# Patient Record
Sex: FEMALE | Race: WHITE | NOT HISPANIC OR LATINO | Employment: UNEMPLOYED | URBAN - NONMETROPOLITAN AREA
[De-identification: names, ages, dates, MRNs, and addresses within clinical notes are randomized per-mention and may not be internally consistent; named-entity substitution may affect disease eponyms.]

---

## 2019-10-23 ENCOUNTER — HOSPITAL ENCOUNTER (EMERGENCY)
Facility: HOSPITAL | Age: 54
Discharge: HOME | End: 2019-10-24
Attending: EMERGENCY MEDICINE | Admitting: FAMILY MEDICINE

## 2019-10-23 ENCOUNTER — APPOINTMENT (OUTPATIENT)
Dept: GENERAL RADIOLOGY | Facility: HOSPITAL | Age: 54
End: 2019-10-23

## 2019-10-23 DIAGNOSIS — M25.461 EFFUSION OF RIGHT KNEE: Primary | ICD-10-CM

## 2019-10-23 PROCEDURE — 99283 EMERGENCY DEPT VISIT LOW MDM: CPT

## 2019-10-23 PROCEDURE — 25010000002 KETOROLAC TROMETHAMINE PER 15 MG: Performed by: PHYSICIAN ASSISTANT

## 2019-10-23 PROCEDURE — 25010000002 MORPHINE PER 10 MG: Performed by: EMERGENCY MEDICINE

## 2019-10-23 PROCEDURE — 73562 X-RAY EXAM OF KNEE 3: CPT | Performed by: RADIOLOGY

## 2019-10-23 PROCEDURE — 96372 THER/PROPH/DIAG INJ SC/IM: CPT

## 2019-10-23 PROCEDURE — 73562 X-RAY EXAM OF KNEE 3: CPT

## 2019-10-23 RX ORDER — KETOROLAC TROMETHAMINE 30 MG/ML
30 INJECTION, SOLUTION INTRAMUSCULAR; INTRAVENOUS ONCE
Status: COMPLETED | OUTPATIENT
Start: 2019-10-23 | End: 2019-10-23

## 2019-10-23 RX ORDER — ONDANSETRON 4 MG/1
4 TABLET, ORALLY DISINTEGRATING ORAL ONCE
Status: COMPLETED | OUTPATIENT
Start: 2019-10-23 | End: 2019-10-23

## 2019-10-23 RX ADMIN — ONDANSETRON 4 MG: 4 TABLET, ORALLY DISINTEGRATING ORAL at 23:24

## 2019-10-23 RX ADMIN — KETOROLAC TROMETHAMINE 30 MG: 30 INJECTION, SOLUTION INTRAMUSCULAR; INTRAVENOUS at 23:18

## 2019-10-23 RX ADMIN — MORPHINE SULFATE 4 MG: 4 INJECTION, SOLUTION INTRAMUSCULAR; INTRAVENOUS at 23:18

## 2019-10-24 ENCOUNTER — APPOINTMENT (OUTPATIENT)
Dept: CT IMAGING | Facility: HOSPITAL | Age: 54
End: 2019-10-24

## 2019-10-24 VITALS
BODY MASS INDEX: 49.51 KG/M2 | HEART RATE: 72 BPM | SYSTOLIC BLOOD PRESSURE: 155 MMHG | WEIGHT: 290 LBS | HEIGHT: 64 IN | RESPIRATION RATE: 19 BRPM | OXYGEN SATURATION: 99 % | TEMPERATURE: 98.2 F | DIASTOLIC BLOOD PRESSURE: 85 MMHG

## 2019-10-24 PROCEDURE — 73700 CT LOWER EXTREMITY W/O DYE: CPT

## 2019-10-24 NOTE — ED NOTES
Provided patient with pillows to support knee at this time.     Collette, Ashley N, RN  10/23/19 5742

## 2020-03-03 ENCOUNTER — TRANSCRIBE ORDERS (OUTPATIENT)
Dept: GENERAL RADIOLOGY | Facility: HOSPITAL | Age: 55
End: 2020-03-03

## 2020-03-03 ENCOUNTER — HOSPITAL ENCOUNTER (OUTPATIENT)
Dept: GENERAL RADIOLOGY | Facility: HOSPITAL | Age: 55
Discharge: HOME | End: 2020-03-03
Admitting: INTERNAL MEDICINE

## 2020-03-03 DIAGNOSIS — M25.562 PAIN IN BOTH KNEES, UNSPECIFIED CHRONICITY: ICD-10-CM

## 2020-03-03 DIAGNOSIS — M13.0 POLYARTHRITIS: ICD-10-CM

## 2020-03-03 DIAGNOSIS — M54.50 LOW BACK PAIN, UNSPECIFIED BACK PAIN LATERALITY, UNSPECIFIED CHRONICITY, UNSPECIFIED WHETHER SCIATICA PRESENT: ICD-10-CM

## 2020-03-03 DIAGNOSIS — M15.0 PRIMARY GENERALIZED (OSTEO)ARTHRITIS: ICD-10-CM

## 2020-03-03 DIAGNOSIS — M25.561 PAIN IN BOTH KNEES, UNSPECIFIED CHRONICITY: ICD-10-CM

## 2020-03-03 DIAGNOSIS — M79.641 PAIN IN BOTH HANDS: ICD-10-CM

## 2020-03-03 DIAGNOSIS — M79.642 PAIN IN BOTH HANDS: ICD-10-CM

## 2020-03-03 DIAGNOSIS — M79.672 PAIN IN BOTH FEET: ICD-10-CM

## 2020-03-03 DIAGNOSIS — M79.671 PAIN IN BOTH FEET: ICD-10-CM

## 2020-03-03 DIAGNOSIS — D89.89 OTHER SPECIFIED DISORDERS INVOLVING THE IMMUNE MECHANISM, NOT ELSEWHERE CLASSIFIED (HCC): ICD-10-CM

## 2020-03-03 PROCEDURE — 73120 X-RAY EXAM OF HAND: CPT

## 2020-03-03 PROCEDURE — 73562 X-RAY EXAM OF KNEE 3: CPT

## 2020-03-03 PROCEDURE — 73630 X-RAY EXAM OF FOOT: CPT

## 2022-01-14 NOTE — ED PROVIDER NOTES
Pt scheduled for this afternoon Subjective     History provided by:  Patient   used: No    Fall   Mechanism of injury: fall    Injury location:  Leg  Leg injury location:  R knee  Incident location:  Outdoors  Time since incident:  2 hours  Arrived directly from scene: yes    Fall:     Fall occurred: knocked down by a calf and twisted knee.    Impact surface:  Dirt    Entrapped after fall: no    Suspicion of alcohol use: no    Suspicion of drug use: no    Prior to arrival data:     Patient ambulatory at scene: yes      Blood loss:  None    Orientation at scene:  Person, place, time and situation    Loss of consciousness: no      Amnesic to event: no      Immobilization:  None  Associated symptoms: no abdominal pain, no back pain, no chest pain and no neck pain        Review of Systems   Constitutional: Negative.  Negative for fever.   HENT: Negative.    Respiratory: Negative.    Cardiovascular: Negative.  Negative for chest pain.   Gastrointestinal: Negative.  Negative for abdominal pain.   Endocrine: Negative.    Genitourinary: Negative.  Negative for dysuria.   Musculoskeletal: Negative for back pain and neck pain.        (+) right knee pain   Skin: Negative.    Neurological: Negative.    Psychiatric/Behavioral: Negative.    All other systems reviewed and are negative.      No past medical history on file.    Allergies   Allergen Reactions   • Sulfa Antibiotics Anaphylaxis       No past surgical history on file.    No family history on file.    Social History     Socioeconomic History   • Marital status:      Spouse name: Not on file   • Number of children: Not on file   • Years of education: Not on file   • Highest education level: Not on file           Objective   Physical Exam   Constitutional: She is oriented to person, place, and time. She appears well-developed and well-nourished. No distress.   Morbidly obese   HENT:   Head: Normocephalic and atraumatic.   Right Ear: External ear normal.   Left Ear: External ear  normal.   Nose: Nose normal.   Eyes: Conjunctivae and EOM are normal. Pupils are equal, round, and reactive to light.   Neck: Normal range of motion. Neck supple. No JVD present. No tracheal deviation present.   Cardiovascular: Normal rate, regular rhythm and normal heart sounds.   No murmur heard.  Pulmonary/Chest: Effort normal and breath sounds normal. No respiratory distress. She has no wheezes.   Abdominal: Soft. Bowel sounds are normal. There is no tenderness.   Musculoskeletal: She exhibits no edema or deformity.        Right knee: She exhibits decreased range of motion. She exhibits no swelling and no deformity. Tenderness found. Medial joint line and lateral joint line tenderness noted.        Cervical back: Normal.        Thoracic back: Normal.        Lumbar back: Normal.   Neurological: She is alert and oriented to person, place, and time. No cranial nerve deficit.   Skin: Skin is warm and dry. No rash noted. She is not diaphoretic. No erythema. No pallor.   Psychiatric: She has a normal mood and affect. Her behavior is normal. Thought content normal.   Nursing note and vitals reviewed.      Procedures           ED Course  ED Course as of Oct 24 0138   Wed Oct 23, 2019   2250 IMPRESSION:   Soft tissue edema and joint effusion. Osteoarthritis but no evidence of fracture or dislocation.    This report was finalized on 10/23/2019 9:51 PM by Dr. Jay Rosas MD.  XR Knee 3 View Right [TK]   u Oct 24, 2019   0121 IMPRESSION:   No evidence of fracture. There is a moderate joint effusion at the knee and there is osteoarthritic change in the medial compartment.    Signer Name: Jarrett Chau MD  Signed: 10/24/2019 1:18 AM  Workstation Name: [id number]   [facility]  CT Lower Extremity Right Without Contrast [TK]      ED Course User Index  [TK] Simeon Tello, NICANOR                  MDM  Number of Diagnoses or Management Options  Effusion of right knee: new and requires workup      Amount and/or Complexity of Data Reviewed  Tests in the radiology section of CPT®: reviewed and ordered    Risk of Complications, Morbidity, and/or Mortality  Presenting problems: moderate  Diagnostic procedures: moderate  Management options: moderate    Patient Progress  Patient progress: stable      Final diagnoses:   Effusion of right knee              Simeon Tello PA-C  10/24/19 0138

## 2023-03-16 ENCOUNTER — TRANSCRIBE ORDERS (OUTPATIENT)
Dept: ADMINISTRATIVE | Facility: HOSPITAL | Age: 58
End: 2023-03-16
Payer: MEDICARE

## 2023-03-16 ENCOUNTER — HOSPITAL ENCOUNTER (OUTPATIENT)
Dept: GENERAL RADIOLOGY | Facility: HOSPITAL | Age: 58
Discharge: HOME | End: 2023-03-16
Admitting: NURSE PRACTITIONER
Payer: MEDICARE

## 2023-03-16 DIAGNOSIS — M25.512 LEFT SHOULDER PAIN, UNSPECIFIED CHRONICITY: Primary | ICD-10-CM

## 2023-03-16 DIAGNOSIS — M79.602 LEFT ARM PAIN: ICD-10-CM

## 2023-03-16 DIAGNOSIS — M25.512 LEFT SHOULDER PAIN, UNSPECIFIED CHRONICITY: ICD-10-CM

## 2023-03-16 PROCEDURE — 73060 X-RAY EXAM OF HUMERUS: CPT

## 2023-03-16 PROCEDURE — 73030 X-RAY EXAM OF SHOULDER: CPT

## 2023-03-16 PROCEDURE — 73030 X-RAY EXAM OF SHOULDER: CPT | Performed by: RADIOLOGY

## 2023-03-16 PROCEDURE — 73060 X-RAY EXAM OF HUMERUS: CPT | Performed by: RADIOLOGY

## 2023-04-03 ENCOUNTER — TRANSCRIBE ORDERS (OUTPATIENT)
Dept: ADMINISTRATIVE | Facility: HOSPITAL | Age: 58
End: 2023-04-03
Payer: MEDICARE

## 2023-04-03 DIAGNOSIS — M79.602 LEFT UPPER LIMB PAIN: ICD-10-CM

## 2023-04-03 DIAGNOSIS — M25.512 LEFT SHOULDER PAIN, UNSPECIFIED CHRONICITY: Primary | ICD-10-CM

## 2023-05-02 ENCOUNTER — HOSPITAL ENCOUNTER (OUTPATIENT)
Dept: MRI IMAGING | Facility: HOSPITAL | Age: 58
Discharge: HOME | End: 2023-05-02
Admitting: NURSE PRACTITIONER
Payer: MEDICARE

## 2023-05-02 DIAGNOSIS — M25.512 LEFT SHOULDER PAIN, UNSPECIFIED CHRONICITY: ICD-10-CM

## 2023-05-02 DIAGNOSIS — M79.602 LEFT UPPER LIMB PAIN: ICD-10-CM

## 2023-05-02 PROCEDURE — 73221 MRI JOINT UPR EXTREM W/O DYE: CPT

## 2023-05-02 PROCEDURE — 73221 MRI JOINT UPR EXTREM W/O DYE: CPT | Performed by: RADIOLOGY

## 2023-06-12 ENCOUNTER — OFFICE VISIT (OUTPATIENT)
Dept: ORTHOPEDIC SURGERY | Facility: CLINIC | Age: 58
End: 2023-06-12
Payer: MEDICARE

## 2023-06-12 VITALS
BODY MASS INDEX: 49.68 KG/M2 | HEIGHT: 64 IN | WEIGHT: 291.01 LBS | DIASTOLIC BLOOD PRESSURE: 74 MMHG | HEART RATE: 69 BPM | SYSTOLIC BLOOD PRESSURE: 119 MMHG

## 2023-06-12 DIAGNOSIS — M75.112 NONTRAUMATIC INCOMPLETE TEAR OF LEFT ROTATOR CUFF: Primary | ICD-10-CM

## 2023-06-12 DIAGNOSIS — M75.42 IMPINGEMENT SYNDROME OF LEFT SHOULDER: ICD-10-CM

## 2023-06-12 PROCEDURE — 99204 OFFICE O/P NEW MOD 45 MIN: CPT | Performed by: ORTHOPAEDIC SURGERY

## 2023-06-12 RX ORDER — LEVOTHYROXINE SODIUM 137 UG/1
TABLET ORAL
COMMUNITY
Start: 2023-03-02

## 2023-06-12 RX ORDER — OMEPRAZOLE 40 MG/1
CAPSULE, DELAYED RELEASE ORAL
COMMUNITY
Start: 2023-02-16

## 2023-06-12 RX ORDER — DILTIAZEM HYDROCHLORIDE 240 MG/1
CAPSULE, COATED, EXTENDED RELEASE ORAL
COMMUNITY
Start: 2023-03-16

## 2023-06-12 RX ORDER — CYANOCOBALAMIN 1000 UG/ML
INJECTION, SOLUTION INTRAMUSCULAR; SUBCUTANEOUS
COMMUNITY
Start: 2023-03-16

## 2023-06-12 RX ORDER — HYDROCHLOROTHIAZIDE 25 MG/1
TABLET ORAL
COMMUNITY
Start: 2023-03-16

## 2023-06-12 RX ORDER — CARIPRAZINE 1.5 MG/1
CAPSULE, GELATIN COATED ORAL
COMMUNITY
Start: 2023-06-05

## 2023-06-12 RX ORDER — ERGOCALCIFEROL 1.25 MG/1
CAPSULE ORAL
COMMUNITY
Start: 2023-03-16

## 2023-06-12 RX ORDER — FLUOXETINE HYDROCHLORIDE 40 MG/1
CAPSULE ORAL
COMMUNITY
Start: 2023-03-16

## 2023-06-12 NOTE — PROGRESS NOTES
New Patient Visit      Patient: April Argueta  YOB: 1965  Date of Encounter: 06/12/2023        Chief Complaint:   Chief Complaint   Patient presents with   • Left Shoulder - Pain           HPI:   April Argueta, 58 y.o. female, referred by Jojo Jung APRN presents evaluation of left shoulder pain which began approximately 6 weeks ago she reports no trauma.  She describes pain with overhead lifting and reaching behind her.  Recalls no trauma recent or remote her current symptoms are moderate.  She has completed MRI.  She has otherwise had no treatment.  Past medical history is remarkable for gastric bypass and breast cancer.  Family history is positive for diabetes.  Denies weakness or numbness to her left arm.          Active Problem List:  Patient Active Problem List   Diagnosis   • Nontraumatic incomplete tear of left rotator cuff           Past Medical History:  Past Medical History:   Diagnosis Date   • Acid reflux    • Breast cancer    • High cholesterol    • Hypertension            Past Surgical History:  Past Surgical History:   Procedure Laterality Date   • CHOLECYSTECTOMY     • GASTRIC BYPASS             Family History:  Family History   Problem Relation Age of Onset   • Diabetes Mother    • Hypertension Mother    • Hypertension Sister    • Diabetes Sister    • Cancer Sister          Social History:  Social History     Socioeconomic History   • Marital status:    Tobacco Use   • Smoking status: Never   • Smokeless tobacco: Never   Vaping Use   • Vaping Use: Never used   Substance and Sexual Activity   • Alcohol use: Never   • Drug use: Never   • Sexual activity: Defer     Body mass index is 49.93 kg/m².      Medications:  Current Outpatient Medications   Medication Sig Dispense Refill   • cyanocobalamin 1000 MCG/ML injection      • dilTIAZem CD (CARDIZEM CD) 240 MG 24 hr capsule      • FLUoxetine (PROzac) 40 MG capsule      • hydroCHLOROthiazide (HYDRODIURIL) 25 MG tablet      •  "levothyroxine (SYNTHROID, LEVOTHROID) 137 MCG tablet      • omeprazole (priLOSEC) 40 MG capsule      • vitamin D (ERGOCALCIFEROL) 1.25 MG (33098 UT) capsule capsule      • Vraylar 1.5 MG capsule capsule        No current facility-administered medications for this visit.         Allergies:  Allergies   Allergen Reactions   • Sulfa Antibiotics Anaphylaxis         Review of Systems:   Review of Systems      Physical Exam:   Physical Exam  GENERAL: 58 y.o. female, alert and oriented X 3 in no acute distress.   Visit Vitals  /74   Pulse 69   Ht 162.6 cm (64.02\")   Wt 132 kg (291 lb 0.1 oz)   BMI 49.93 kg/m²       GENERAL APPEARANCE: Awake, alert & oriented, in no acute distress and well developed, well nourished.   PSYCH: Normal mood and affect  LUNGS: Breathing nonlabored, no wheezing  EYES: Sclera anicteric, pupils equal  CARDIOVASCULAR: Palpable pulses. Capillary refill less than 2 seconds  INTEGUMENTARY: Skin intact, co clubbing, cyanosis  NEUROLOGIC: Normal Sensation  MUSCULOSKELETAL:  Orthopedic Examination: The upper extremity reveals elevation to 170 degrees moderate signs of impingement with Jobes maneuver she has mild to moderate discomfort demonstrates good strength.  She has full internal and external rotation mild localized tenderness to the AC joint and moderate pain with crossarm adduction bicipital groove is nontender pushoff test is negative neurovascular exam is grossly intact.          Radiology/Labs:     Radiographs shoulder reviewed demonstrates normal glenohumeral joint normal subacromial space moderate arthritic changes involving the acromioclavicular joint.    MRI left shoulder by report describes this tear distal supraspinatus tendon anterior fibers is approximately 7 x 6 mm.  Mild arthritis of the acromioclavicular joint and moderate subcu acromial bursitis.  My review confirms the above with severe impingement.        Assessment & Plan:   58 y.o. female presents atraumatic left shoulder " pain which began 6 weeks ago MRI identifying partial-thickness rotator cuff tear with moderate impingement.  We discussed options she is referred to physical therapy but may eventually require subacromial decompression.  We will see her back in 8 weeks.        ICD-10-CM ICD-9-CM   1. Nontraumatic incomplete tear of left rotator cuff  M75.112 726.13   2. Impingement syndrome of left shoulder  M75.42 726.2             Cc:   Jojo Jung APRN                This document has been electronically signed by Emeterio Colin MD   June 13, 2023 10:05 EDT

## 2023-06-13 PROBLEM — M75.112 NONTRAUMATIC INCOMPLETE TEAR OF LEFT ROTATOR CUFF: Status: ACTIVE | Noted: 2023-06-13

## 2023-06-15 ENCOUNTER — TREATMENT (OUTPATIENT)
Dept: PHYSICAL THERAPY | Facility: CLINIC | Age: 58
End: 2023-06-15
Payer: MEDICARE

## 2023-06-15 DIAGNOSIS — M75.42 IMPINGEMENT SYNDROME OF LEFT SHOULDER: ICD-10-CM

## 2023-06-15 DIAGNOSIS — M75.112 NONTRAUMATIC INCOMPLETE TEAR OF LEFT ROTATOR CUFF: Primary | ICD-10-CM

## 2023-06-15 NOTE — PROGRESS NOTES
"    Physical Therapy Initial Evaluation and Plan of Care    Patient: April Argueta   : 1965  Diagnosis/ICD-10 Code:  Nontraumatic incomplete tear of left rotator cuff [M75.112]  Referring practitioner: Emeterio Colin, *  Date of Initial Visit: 6/15/2023  Today's Date: 6/15/2023  Patient seen for 1 session         Visit Diagnoses:    ICD-10-CM ICD-9-CM   1. Nontraumatic incomplete tear of left rotator cuff  M75.112 726.13   2. Impingement syndrome of left shoulder  M75.42 726.2         Subjective Questionnaire: QuickDASH: 36.36% impaired      Subjective Evaluation    History of Present Illness  Onset date: 1.5 months.  Mechanism of injury: Pt reports approximately 1.5 months ago she began having soreness of the left shoulder. The patient states she does not recall a specific incident that resulted in increased pain. She states she would use her right arm to help assist the left with movement. The patient reports improvement in left shoulder pain, however continues to have difficulty with self-care and daily tasks. She states an MRI was performed of the left shoulder, revealing a tear of the rotator cuff. The patient reports she has seen Dr. Colin and was referred to physical therapy. She states he instructed her to return in 8 weeks to consider surgery if physical therapy does not help with pain. The patient states the bones \"are together and rubbing on the tendon, so I don't know if I can tolerate therapy but I will try\". The patient states she is hoping to avoid surgery.      Patient Occupation: Unemployed Quality of life: good    Pain  Current pain ratin  At best pain ratin  At worst pain ratin  Location: Left shoulder  Quality: dull ache and burning  Relieving factors: rest  Aggravating factors: lifting, movement, overhead activity, prolonged positioning, outstretched reach, repetitive movement and sleeping  Progression: improved    Social Support  Lives with: spouse    Hand " "dominance: right    Diagnostic Tests  Abnormal MRI: \"7 mm x 6 mm full-thickness tear distal supraspinatus tendon anterior fibers at level of humeral insertion. No retraction.\" See chart review.    Patient Goals  Patient goals for therapy: decreased edema, decreased pain, increased motion, increased strength, independence with ADLs/IADLs and return to sport/leisure activities           Objective          Cervical/Thoracic Screen   Cervical range of motion within normal limits    Neurological Testing     Sensation     Shoulder   Left Shoulder   Intact: light touch    Right Shoulder   Intact: Light touch    Active Range of Motion   Left Shoulder   Flexion: 162 degrees   Abduction: 124 degrees   External rotation 90°: 72 degrees   Internal rotation 90°: 61 degrees     Strength/Myotome Testing     Left Shoulder     Planes of Motion   Flexion: 4   Abduction: 4   External rotation at 0°: 4+   Internal rotation at 0°: 4+     Isolated Muscles   Biceps: 4   Triceps: 4     Right Shoulder     Planes of Motion   Flexion: 4+   Abduction: 4+   External rotation at 0°: 4+   Internal rotation at 0°: 4+     Isolated Muscles   Biceps: 4+   Triceps: 4+     Left Wrist/Hand      (2nd hand position)     Trial 1: 55 lbs    Trial 2: 54 lbs    Trial 3: 45 lbs    Average: 51.33 lbs    Right Wrist/Hand      (2nd hand position)     Trial 1: 60 lbs    Trial 2: 62 lbs    Trial 3: 60 lbs    Average: 60.67 lbs    Tests   Cervical     Left   Negative active compression ([place]).     Left Shoulder   Positive Hawkin's.   Negative drop arm.         Assessment & Plan     Assessment  Impairments: abnormal or restricted ROM, activity intolerance, impaired physical strength, lacks appropriate home exercise program, pain with function and safety issue  Functional Limitations: carrying objects, lifting, sleeping, pulling, pushing, uncomfortable because of pain, reaching behind back, reaching overhead and unable to perform repetitive " "tasks  Assessment details: The patient is a 58 year old female presenting to the clinic with left shoulder pain. She demonstrated impaired left shoulder ROM and strength. MRI findings revealed a left rotator cuff tear. The patient reported a 36.36% impairment on the Quick Dash. The patient would benefit from skilled physical therapy to address functional limitations and impairments. She will be progressed as indicated to achieve max function.  Today's session included therapeutic exercises for improved left shoulder ROM and scapular stability. Tactile and verbal cues were provided for proper form. The patient was instructed to perform therapeutic exercises in the \"pain-free range\". Today's session concluded with cryotherapy combined with IFC to the left UT/shoulder to address pain and inflammation. No skin irritation was observed following modalities. The patient reported 1/10 left shoulder pain following today's session. A written HEP was provided to the patient, with patient demonstrating understanding and proper form.    Prognosis: good    Goals  Plan Goals: SHORT TERM GOALS:     2 weeks  1. Pt will be instructed in HEP for improved independence.  2. Pt to demonstrate AROM of the left shoulder to 17 degrees flexion and 140 abduction to improve ability to perform ADL's.  3. Pt to demonstrate ability to perform 30 minutes continuous activity in the clinic without increase in pain for improved independence.    LONG TERM GOALS:   6 weeks  1. Pt to demonstrate AROM of the left shoulder to WNL to allow ability to perform all necessary functional activities  2. Pt to demonstrate 4+/5 LUE strength for improved function.  3. Pt to report no more than 20% impairment on the Quick Dash for improved functional independence.      Plan  Therapy options: will be seen for skilled therapy services  Planned modality interventions: iontophoresis, cryotherapy, electrical stimulation/Russian stimulation, thermotherapy (hydrocollator " packs), ultrasound, hydrotherapy and dry needling  Planned therapy interventions: manual therapy, motor coordination training, neuromuscular re-education, postural training, soft tissue mobilization, spinal/joint mobilization, stretching, strengthening, therapeutic activities, joint mobilization, home exercise program, functional ROM exercises, flexibility, fine motor coordination training and body mechanics training  Frequency: 2x week  Duration in weeks: 12  Treatment plan discussed with: patient  Plan details: Pt will be re-assessed upon follow up for tolerance to pain relieving exercise program.     Moderate Evaluation  32791  Re-evaluation   93028    Therapeutic exercise  83812  Therapeutic activity    52250  Neuromuscular re-education   51605  Manual therapy   94598    Attended e-stim  66412  Unattended e-stim (Medicaid/Medicare)     Moist heat/cryotherapy 02082   Ultrasound   34019  Iontophoresis   18159                  Timed:         Manual Therapy:         mins  59423;     Therapeutic Exercise:    12     mins  75158;     Neuromuscular Jarvis:        mins  82759;    Therapeutic Activity:          mins  91208;     Gait Training:           mins  01043;     Ultrasound:          mins  48719;    Ionto                                   mins   69177  Self Care                            mins   61443      Un-Timed:  Electrical Stimulation:    10     mins  49472 ( );  Dry Needling          mins self-pay  Traction          mins 16936  Low Eval          Mins  27148  Mod Eval     30     Mins  75359  High Eval                            Mins  27487  Canalith Repos         mins 34947      Timed Treatment:   12   mins   Total Treatment:     52   mins          PT: Ashley Claudene Dalton, PT     License Number: [id number]  Electronically signed by Ashley Claudene Dalton, PT, 06/15/23, 10:13 AM EDT    Certification Period: 6/15/2023 thru 9/12/2023  I certify that the therapy services are furnished while this patient is  under my care.  The services outlined above are required by this patient, and will be reviewed every 90 days.         Physician Signature:__________________________________________________    PHYSICIAN: Emeterio Colin MD  NPI: [id number]                                      DATE:      Please sign and return via fax to [email or web address] . Thank you, [facility].

## 2023-06-19 ENCOUNTER — TREATMENT (OUTPATIENT)
Dept: PHYSICAL THERAPY | Facility: CLINIC | Age: 58
End: 2023-06-19
Payer: MEDICARE

## 2023-06-19 DIAGNOSIS — M75.42 IMPINGEMENT SYNDROME OF LEFT SHOULDER: ICD-10-CM

## 2023-06-19 DIAGNOSIS — M75.112 NONTRAUMATIC INCOMPLETE TEAR OF LEFT ROTATOR CUFF: Primary | ICD-10-CM

## 2023-06-19 PROCEDURE — 97110 THERAPEUTIC EXERCISES: CPT | Performed by: PHYSICAL THERAPIST

## 2023-06-19 PROCEDURE — G0283 ELEC STIM OTHER THAN WOUND: HCPCS | Performed by: PHYSICAL THERAPIST

## 2023-06-19 NOTE — PROGRESS NOTES
Physical Therapy Daily Treatment Note      Patient: April Argueta   : 1965  Referring practitioner: Emeterio Colin, *  Date of Initial Visit: Type: THERAPY  Noted: 6/15/2023  Today's Date: 2023  Patient seen for 2 sessions       Visit Diagnoses:    ICD-10-CM ICD-9-CM   1. Nontraumatic incomplete tear of left rotator cuff  M75.112 726.13   2. Impingement syndrome of left shoulder  M75.42 726.2       Subjective: Patient reports 1-2/10 left shoulder pain upon arrival to therapy. Pt verbalizes compliance of initiating home program.      Objective   See Exercise, Manual, and Modality Logs for complete treatment.       Assessment/Plan: Patient completed today's session with reports of 2/10 left shoulder pain following. Treatment consisted of therex as listed to assist with improved left shoulder ROM and improved scapular stability f/b modalities at conclusion. Pt was provided with cues and demonstration during exercise to maintain form, and for improved scapular awareness. Pt educated to perform activities to her tolerance w/ pt verbalizing understanding. Pt continues to benefit from therapy services and will be progressed as tolerated to address goals, reduce pain and improve function. No adverse reactions observed during, and/ or following tx. Continue with PT's POC.       Timed:         Manual Therapy:         mins  83849;     Therapeutic Exercise:     32    mins  84200;     Neuromuscular Jarvis:        mins  70281;    Therapeutic Activity:          mins  95591;     Gait Training:           mins  34225;     Ultrasound:          mins  36059;    Ionto                                   mins   53487  Self Care                            mins   82543      Un-Timed:  Electrical Stimulation:   10      mins  92049 ( );  Dry Needling          mins self-pay  Traction         mins 25739  Canalith Repos         mins 22850    Timed Treatment:  32    mins   Total Treatment:    42    mins    Ai Yun.  Joanna, PTA  [place] License: [id number]

## 2023-06-28 ENCOUNTER — TELEPHONE (OUTPATIENT)
Dept: PHYSICAL THERAPY | Facility: CLINIC | Age: 58
End: 2023-06-28

## 2023-07-25 PROBLEM — D50.9 IRON DEFICIENCY ANEMIA, UNSPECIFIED: Status: ACTIVE | Noted: 2023-07-25

## 2023-07-26 ENCOUNTER — INFUSION (OUTPATIENT)
Dept: ONCOLOGY | Facility: HOSPITAL | Age: 58
End: 2023-07-26
Payer: MEDICARE

## 2023-07-26 ENCOUNTER — TREATMENT (OUTPATIENT)
Dept: PHYSICAL THERAPY | Facility: CLINIC | Age: 58
End: 2023-07-26
Payer: MEDICARE

## 2023-07-26 VITALS
SYSTOLIC BLOOD PRESSURE: 103 MMHG | OXYGEN SATURATION: 96 % | HEART RATE: 76 BPM | RESPIRATION RATE: 18 BRPM | TEMPERATURE: 97.8 F | DIASTOLIC BLOOD PRESSURE: 66 MMHG

## 2023-07-26 DIAGNOSIS — M75.112 NONTRAUMATIC INCOMPLETE TEAR OF LEFT ROTATOR CUFF: Primary | ICD-10-CM

## 2023-07-26 DIAGNOSIS — D50.9 IRON DEFICIENCY ANEMIA, UNSPECIFIED IRON DEFICIENCY ANEMIA TYPE: Primary | ICD-10-CM

## 2023-07-26 DIAGNOSIS — M75.42 IMPINGEMENT SYNDROME OF LEFT SHOULDER: ICD-10-CM

## 2023-07-26 PROCEDURE — 25010000002 IRON SUCROSE PER 1 MG: Performed by: NURSE PRACTITIONER

## 2023-07-26 PROCEDURE — 96374 THER/PROPH/DIAG INJ IV PUSH: CPT

## 2023-07-26 PROCEDURE — 96365 THER/PROPH/DIAG IV INF INIT: CPT

## 2023-07-26 RX ADMIN — IRON SUCROSE 100 MG: 20 INJECTION, SOLUTION INTRAVENOUS at 14:17

## 2023-07-26 NOTE — PROGRESS NOTES
Physical Therapy Re Certification Of Plan of Care  Patient: April Argueta   : 1965  Diagnosis/ICD-10 Code:  Nontraumatic incomplete tear of left rotator cuff [M75.112]  Referring practitioner: Emeterio Colin, *  Date of Initial Visit: Type: THERAPY  Noted: 6/15/2023  Today's Date: 2023  Patient seen for 7 sessions         Visit Diagnoses:    ICD-10-CM ICD-9-CM   1. Nontraumatic incomplete tear of left rotator cuff  M75.112 726.13   2. Impingement syndrome of left shoulder  M75.42 726.2         April Argueta reports:   Subjective Questionnaire: QuickDASH: 32%  Clinical Progress: improved  Home Program Compliance: Yes  Treatment has included: therapeutic exercise, neuromuscular re-education, manual therapy, therapeutic activity, electrical stimulation, ultrasound, moist heat, and cryotherapy      Subjective Evaluation    History of Present Illness    Subjective comment: Pt reports having little change with her left shoulder pain and will see her MD in 2 weeks.Pain  Current pain rating: 3  At best pain ratin  At worst pain ratin  Location: left shoulder           Objective          Passive Range of Motion   Left Shoulder   Flexion: 165 degrees   Abduction: 145 degrees   External rotation 45°: WFL  Internal rotation 45°: WFL    Strength/Myotome Testing     Left Shoulder     Planes of Motion   Flexion: 4   Abduction: 4   External rotation at 0°: 4+   Internal rotation at 0°: 4+         Assessment & Plan     Assessment  Impairments: abnormal or restricted ROM, activity intolerance, impaired physical strength, lacks appropriate home exercise program, pain with function and safety issue  Functional Limitations: carrying objects, lifting, sleeping, pulling, pushing, uncomfortable because of pain, reaching behind back, reaching overhead and unable to perform repetitive tasks  Assessment details: The patient is a 58 year old female presenting to the clinic with left shoulder pain.   Pt has attended 7  sessions with good effort and attendance.  Pt has noted gains with shoulder ROM, strength and improved function.  Pt cont to report similar pain at this time.  Pt improved her Quick Dash to 32%  Prognosis: good    Goals  Plan Goals: SHORT TERM GOALS:     2 weeks  1. Pt will be instructed in HEP for improved independence.met  2. Pt to demonstrate AROM of the left shoulder to 170 degrees flexion and 140 abduction to improve ability to perform ADL's.progressing  3. Pt to demonstrate ability to perform 30 minutes continuous activity in the clinic without increase in pain for improved independence.not met    LONG TERM GOALS:   6 weeks  1. Pt to demonstrate AROM of the left shoulder to WNL to allow ability to perform all necessary functional activities. Progressing  2. Pt to demonstrate 4+/5 LUE strength for improved function.progressing  3. Pt to report no more than 20% impairment on the Quick Dash for improved functional independence.not met      Plan  Therapy options: will be seen for skilled therapy services  Planned modality interventions: iontophoresis, cryotherapy, electrical stimulation/Russian stimulation, thermotherapy (hydrocollator packs), ultrasound, hydrotherapy and dry needling  Planned therapy interventions: manual therapy, motor coordination training, neuromuscular re-education, postural training, soft tissue mobilization, spinal/joint mobilization, stretching, strengthening, therapeutic activities, joint mobilization, home exercise program, functional ROM exercises, flexibility, fine motor coordination training and body mechanics training  Frequency: 2x week  Duration in weeks: 8  Treatment plan discussed with: patient  Plan details: Will follow for optimal gains.     Moderate Evaluation  04358  Re-evaluation   42385    Therapeutic exercise  05910  Therapeutic activity    89436  Neuromuscular re-education   81028  Manual therapy   39682    Attended e-stim  11849  Unattended e-stim (Medicaid/Medicare)      Moist heat/cryotherapy 45996   Ultrasound   76179  Iontophoresis   13805               Recommendations: Continue as planned  Timeframe: 2 months  Prognosis to achieve goals: good      Timed:         Manual Therapy:         mins  81510;     Therapeutic Exercise:    31     mins  41462;     Neuromuscular Jarvis:        mins  04587;    Therapeutic Activity:     11     mins  75565;     Gait Training:           mins  29966;     Ultrasound:          mins  93652;    Ionto                                   mins   40947  Self Care                            mins   83159    Un-Timed:  Electrical Stimulation:   10      mins  84866 ( );  Dry Needling          mins self-pay  Traction          mins 72910  Re-Eval                               mins  08697  Canalith Repos         mins 32078    Timed Treatment:   42   mins   Total Treatment:     52   mins          PT: Malick Gonsalves PT     [place] License:  [id number]    Electronically signed by Malick Gonsalves PT, 07/26/23, 10:08 AM EDT    Certification Period: 7/26/2023 thru 10/23/2023  I certify that the therapy services are furnished while this patient is under my care.  The services outlined above are required by this patient, and will be reviewed every 90 days.         Physician Signature:__________________________________________________    PHYSICIAN: Emeterio Colin MD  NPI: [id number]                                      DATE:  :     Please sign and return via fax to [email or web address] . Thank you, [facility]

## 2023-08-02 ENCOUNTER — TREATMENT (OUTPATIENT)
Dept: PHYSICAL THERAPY | Facility: CLINIC | Age: 58
End: 2023-08-02
Payer: MEDICARE

## 2023-08-02 ENCOUNTER — INFUSION (OUTPATIENT)
Dept: ONCOLOGY | Facility: HOSPITAL | Age: 58
End: 2023-08-02
Payer: MEDICARE

## 2023-08-02 VITALS
OXYGEN SATURATION: 99 % | DIASTOLIC BLOOD PRESSURE: 72 MMHG | TEMPERATURE: 97.7 F | SYSTOLIC BLOOD PRESSURE: 132 MMHG | HEART RATE: 66 BPM | RESPIRATION RATE: 18 BRPM

## 2023-08-02 DIAGNOSIS — M75.112 NONTRAUMATIC INCOMPLETE TEAR OF LEFT ROTATOR CUFF: Primary | ICD-10-CM

## 2023-08-02 DIAGNOSIS — M75.42 IMPINGEMENT SYNDROME OF LEFT SHOULDER: ICD-10-CM

## 2023-08-02 DIAGNOSIS — D50.9 IRON DEFICIENCY ANEMIA, UNSPECIFIED IRON DEFICIENCY ANEMIA TYPE: Primary | ICD-10-CM

## 2023-08-02 PROCEDURE — 96365 THER/PROPH/DIAG IV INF INIT: CPT

## 2023-08-02 PROCEDURE — 96374 THER/PROPH/DIAG INJ IV PUSH: CPT

## 2023-08-02 PROCEDURE — 25010000002 IRON SUCROSE PER 1 MG: Performed by: NURSE PRACTITIONER

## 2023-08-02 RX ADMIN — IRON SUCROSE 100 MG: 20 INJECTION, SOLUTION INTRAVENOUS at 13:31

## 2023-08-07 ENCOUNTER — OFFICE VISIT (OUTPATIENT)
Dept: ORTHOPEDIC SURGERY | Facility: CLINIC | Age: 58
End: 2023-08-07
Payer: MEDICARE

## 2023-08-07 VITALS
HEIGHT: 64 IN | BODY MASS INDEX: 49.68 KG/M2 | HEART RATE: 65 BPM | WEIGHT: 291.01 LBS | DIASTOLIC BLOOD PRESSURE: 65 MMHG | SYSTOLIC BLOOD PRESSURE: 122 MMHG

## 2023-08-07 DIAGNOSIS — M75.112 NONTRAUMATIC INCOMPLETE TEAR OF LEFT ROTATOR CUFF: Primary | ICD-10-CM

## 2023-08-07 PROCEDURE — 99213 OFFICE O/P EST LOW 20 MIN: CPT | Performed by: ORTHOPAEDIC SURGERY

## 2023-08-09 ENCOUNTER — INFUSION (OUTPATIENT)
Dept: ONCOLOGY | Facility: HOSPITAL | Age: 58
End: 2023-08-09
Payer: MEDICARE

## 2023-08-09 VITALS
SYSTOLIC BLOOD PRESSURE: 125 MMHG | OXYGEN SATURATION: 97 % | DIASTOLIC BLOOD PRESSURE: 71 MMHG | TEMPERATURE: 96.9 F | RESPIRATION RATE: 18 BRPM | HEART RATE: 61 BPM

## 2023-08-09 DIAGNOSIS — D50.9 IRON DEFICIENCY ANEMIA, UNSPECIFIED IRON DEFICIENCY ANEMIA TYPE: Primary | ICD-10-CM

## 2023-08-09 PROCEDURE — 96374 THER/PROPH/DIAG INJ IV PUSH: CPT

## 2023-08-09 PROCEDURE — 25010000002 IRON SUCROSE PER 1 MG: Performed by: NURSE PRACTITIONER

## 2023-08-09 PROCEDURE — 96365 THER/PROPH/DIAG IV INF INIT: CPT

## 2023-08-09 RX ADMIN — IRON SUCROSE 100 MG: 20 INJECTION, SOLUTION INTRAVENOUS at 13:29

## 2023-08-15 ENCOUNTER — TREATMENT (OUTPATIENT)
Dept: PHYSICAL THERAPY | Facility: CLINIC | Age: 58
End: 2023-08-15
Payer: MEDICARE

## 2023-08-15 DIAGNOSIS — M75.42 IMPINGEMENT SYNDROME OF LEFT SHOULDER: ICD-10-CM

## 2023-08-15 DIAGNOSIS — M75.112 NONTRAUMATIC INCOMPLETE TEAR OF LEFT ROTATOR CUFF: Primary | ICD-10-CM

## 2023-08-15 NOTE — PROGRESS NOTES
Physical Therapy Daily Treatment Note      Patient: April Argueta   : 1965  Referring practitioner: Emeterio Colin, *  Date of Initial Visit: Type: THERAPY  Noted: 6/15/2023  Today's Date: 8/15/2023  Patient seen for 9 sessions       Visit Diagnoses:    ICD-10-CM ICD-9-CM   1. Nontraumatic incomplete tear of left rotator cuff  M75.112 726.13   2. Impingement syndrome of left shoulder  M75.42 726.2       Subjective: Patient states of no shoulder pain prior to tx; soreness only. Pt reports she has been ham[place] tomatoes all week, which she attributes to the increased soreness. Pt had f/u appointment with referring ortho on 23, and received recommendation to continue with therapy services.     Objective   See Exercise, Manual, and Modality Logs for complete treatment.       Assessment/Plan: Patient completed today's session with reports of slight increase in soreness following. No signs of distress or complaints observed. Treatment consisted of therex and therapeutic activities per flow sheet with continued focus on improved shoulder ROM, strength, and to ease difficulty with tasks like putting her hair up. Cryotherapy with Estim applied at conclusion. Exercise progressed with strengthening reps increased as tolerated, and additional scapular activities initiated including low rows and lat pulls with theraband. Pt observed with good tolerance and was provided with cues and demonstration for form. Pt continues to benefit from therapy services and will be progressed as tolerated to address goals, reduce pain and improve function. No adverse reactions observed during, and/ or following tx. Continue with PT's POC.   (1/10 pain post tx)      Timed:         Manual Therapy:         mins  28806;     Therapeutic Exercise:   34      mins  29201;     Neuromuscular Jarvis:        mins  79306;    Therapeutic Activity:    10      mins  80586;     Gait Training:           mins  02951;     Ultrasound:          mins   84511;    Ionto                                   mins   74594  Self Care                            mins   43037      Un-Timed:  Electrical Stimulation:    10     mins  07966 ( );  Dry Needling          mins self-pay  Traction          mins 42038  Canalith Repos         mins 76519    Timed Treatment:   44   mins   Total Treatment:    54    mins    Ai Yun. TERESA Marshall  [place] License: [id number]
